# Patient Record
(demographics unavailable — no encounter records)

---

## 2025-05-19 NOTE — HEALTH RISK ASSESSMENT
[Fair] :  ~his/her~ mood as fair [2 - 4 times a month (2 pts)] : 2-4 times a month (2 points) [1 or 2 (0 pts)] : 1 or 2 (0 points) [Never (0 pts)] : Never (0 points) [No] : In the past 12 months have you used drugs other than those required for medical reasons? No [Any fall with injury in past year] : Patient reported fall with injury in the past year [2] : 1) Little interest or pleasure doing things for more than half of the days (2) [1] : 2) Feeling down, depressed, or hopeless for several days (1) [PHQ-2 Positive] : PHQ-2 Positive [1/2 of Days or More (2)] : 1.) Little interest or pleasure in doing things? Half the days or more [Several Days (1)] : 2.) Feeling down, depressed or hopeless? Several days [Nearly Every Day (3)] : 3.) Trouble falling asleep, or sleeping too much? Nearly every day [Not at All (0)] : 9.) Thoughts that you would be off dead or of hurting yourself in some way? Not at all [Mild] : Severity of Depression is Mild [Not at all] : How difficult have these problems made it for you to do your work, take care of things at home, or get along with people? Not at all [Alone] : lives alone [Feels Safe at Home] : Feels safe at home [Fully functional (bathing, dressing, toileting, transferring, walking, feeding)] : Fully functional (bathing, dressing, toileting, transferring, walking, feeding) [Fully functional (using the telephone, shopping, preparing meals, housekeeping, doing laundry, using] : Fully functional and needs no help or supervision to perform IADLs (using the telephone, shopping, preparing meals, housekeeping, doing laundry, using transportation, managing medications and managing finances) [Smoke Detector] : smoke detector [Seat Belt] :  uses seat belt [Former] : Former [20 or more] : 20 or more [< 15 Years] : < 15 Years [de-identified] : Pt on her feet all day for [employer] job but no formal exercise habits [de-identified] : Pt on modified diet for oral procedure; eating mushy foods cottage cheese, soups, tuna, yoghurt [Yes] : Yes [de-identified] : No specialists [Audit-CScore] : 2 [de-identified] : Fall with ED visit 5/4 [[id number]] : 3 [[id number]] : 6 [Change in mental status noted] : No change in mental status noted [Reports changes in hearing] : Reports no changes in hearing [Reports changes in vision] : Reports no changes in vision [de-identified] : quit 2018, was smoking 1ppd except for her pregnancies for 40 years

## 2025-05-19 NOTE — PAST MEDICAL HISTORY
[Postmenopausal] : postmenopausal [Total Preg ___] : G[unfilled] [Live Births ___] : P[unfilled]  [Full Term ___] : Full Term: [unfilled] [Abortions ___] : Abortions:[unfilled] [Living ___] : Living: [unfilled] [AB Spont ___] : miscarriages: [unfilled]  [de-identified] : last period 15 years ago

## 2025-05-19 NOTE — HISTORY OF PRESENT ILLNESS
[FreeTextEntry1] : Establish care CPE Diabetes ED follow up [de-identified] : Patient is a 67 year old female with PMHx DM2 who presents to our office to establish care.  Patient was seen in [facility] [place] [facility] 5/4 after a loss of conciousness leading to a fall which required stitches to her eyebrow. CT imaging including head, neck, and facial were negative. Patient followed up at  5/12 for suture removal where she was sent to the ED for elevated glucose levels and ketones in the urine. Glucose was 325 on 5/4 and 232 on 5/12. Patient had sutures removed, was previously on metformin for DM2 however did not have a PCP so had not been taking it. She was diagnosed with DMII in 2018. She was prescribed metforming by the ED while awaiting her appointment for today. Patient has thus been taking her metformin for the last two days. Pt does note episodes of lightheadedness, dizziness, and mood swings including irritability. Patient noted she was getting up to urinate a lot overnight however since starting metformin she states this has improved.  Patient has not followed with a doctor for many years. Pt was dx with DM2 in 2018, pt was started on metformin at that time and took for approx 1 year. Patient then was taking a berberbine supplement. She believes at time of diagnosis that her A1c was was approx 7.6%, and it was never rechecked per her knowledge. She was never on other medications for her diabetes.  Patient does take several OTC medications/supplements for sleep including magnesium glycinate, potassium, melatonin, and marijuana gummies which helps her to get 6 approx 6 hours of sleep per night. The only other supplement she takes is lions anam.  Patient has history of depression and suicide attempt where she took pills and alcohol in 2018 and was hospitalized for 10 days. She was trialed on antidepressants at that time however she did not like the way they made her feel and she stopped taking them. She now has a very strong support system. She no longer has thoughts of harming herself.

## 2025-05-19 NOTE — HISTORY OF PRESENT ILLNESS
[FreeTextEntry1] : Establish care CPE Diabetes ED follow up [de-identified] : Patient is a 67 year old female with PMHx DM2 who presents to our office to establish care.  Patient was seen in [facility] ED 5/4 after a loss of conciousness leading to a fall which required stitches to her eyebrow. CT imaging including head, neck, and facial were negative. Patient followed up at  5/12 for suture removal where she was sent to the ED for elevated glucose levels and ketones in the urine. Glucose was 325 on 5/4 and 232 on 5/12. Patient had sutures removed, was previously on metformin for DM2 however did not have a PCP so had not been taking it. She was diagnosed with DMII in 2018. She was prescribed metforming by the ED while awaiting her appointment for today. Patient has thus been taking her metformin for the last two days. Pt does note episodes of lightheadedness, dizziness, and mood swings including irritability. Patient noted she was getting up to urinate a lot overnight however since starting metformin she states this has improved.  Patient has not followed with a doctor for many years. Pt was dx with DM2 in 2018, pt was started on metformin at that time and took for approx 1 year. Patient then was taking a berberbine supplement. She believes at time of diagnosis that her A1c was was approx 7.6%, and it was never rechecked per her knowledge. She was never on other medications for her diabetes.  Patient does take several OTC medications/supplements for sleep including magnesium glycinate, potassium, melatonin, and marijuana gummies which helps her to get 6 approx 6 hours of sleep per night. The only other supplement she takes is lions anam.  Patient has history of depression and suicide attempt where she took pills and alcohol in 2018 and was hospitalized for 10 days. She was trialed on antidepressants at that time however she did not like the way they made her feel and she stopped taking them. She now has a very strong support system. She no longer has thoughts of harming herself.

## 2025-05-19 NOTE — PAST MEDICAL HISTORY
[Postmenopausal] : postmenopausal [Total Preg ___] : G[unfilled] [Live Births ___] : P[unfilled]  [Full Term ___] : Full Term: [unfilled] [Abortions ___] : Abortions:[unfilled] [Living ___] : Living: [unfilled] [AB Spont ___] : miscarriages: [unfilled]  [de-identified] : last period 15 years ago

## 2025-05-19 NOTE — HEALTH RISK ASSESSMENT
[Fair] :  ~his/her~ mood as fair [2 - 4 times a month (2 pts)] : 2-4 times a month (2 points) [1 or 2 (0 pts)] : 1 or 2 (0 points) [Never (0 pts)] : Never (0 points) [No] : In the past 12 months have you used drugs other than those required for medical reasons? No [Any fall with injury in past year] : Patient reported fall with injury in the past year [2] : 1) Little interest or pleasure doing things for more than half of the days (2) [1] : 2) Feeling down, depressed, or hopeless for several days (1) [PHQ-2 Positive] : PHQ-2 Positive [1/2 of Days or More (2)] : 1.) Little interest or pleasure in doing things? Half the days or more [Several Days (1)] : 2.) Feeling down, depressed or hopeless? Several days [Nearly Every Day (3)] : 3.) Trouble falling asleep, or sleeping too much? Nearly every day [Not at All (0)] : 9.) Thoughts that you would be off dead or of hurting yourself in some way? Not at all [Mild] : Severity of Depression is Mild [Not at all] : How difficult have these problems made it for you to do your work, take care of things at home, or get along with people? Not at all [Alone] : lives alone [Feels Safe at Home] : Feels safe at home [Fully functional (bathing, dressing, toileting, transferring, walking, feeding)] : Fully functional (bathing, dressing, toileting, transferring, walking, feeding) [Fully functional (using the telephone, shopping, preparing meals, housekeeping, doing laundry, using] : Fully functional and needs no help or supervision to perform IADLs (using the telephone, shopping, preparing meals, housekeeping, doing laundry, using transportation, managing medications and managing finances) [Smoke Detector] : smoke detector [Seat Belt] :  uses seat belt [Former] : Former [20 or more] : 20 or more [< 15 Years] : < 15 Years [de-identified] : 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

## 2025-05-19 NOTE — REVIEW OF SYSTEMS
[Dizziness] : dizziness [Fever] : no fever [Chills] : no chills [Night Sweats] : no night sweats [Sore Throat] : no sore throat [Chest Pain] : no chest pain [Palpitations] : no palpitations [Lower Ext Edema] : no lower extremity edema [Orthopnea] : no orthopnea [Shortness Of Breath] : no shortness of breath [Wheezing] : no wheezing [Cough] : no cough [Abdominal Pain] : no abdominal pain [Constipation] : no constipation [Diarrhea] : diarrhea [Vomiting] : no vomiting [Joint Pain] : no joint pain [Muscle Pain] : no muscle pain [Skin Rash] : no skin rash [Suicidal] : not suicidal [Frequency] : frequency [Negative] : Heme/Lymph [de-identified] : Lower extremity numbness/tingling

## 2025-05-19 NOTE — ASSESSMENT
[Vaccines Reviewed] : Immunizations reviewed today. Please see immunization details in the vaccine log within the immunization flowsheet.  [FreeTextEntry1] : Patient is a 67 year old female with PMHx DM2 who presents to our office to establish care.  Health Care Maintenance - Discussed healthy diet and physical activity - Obtain age appropriate screening, including: mammogram, colonoscopy, pap smear, DEXA scan - Discussed lung cancer screening. Patient is agreeable. - Tdap and PCV21 vaccines administered - Yearly flu vaccine encouraged  - Encouraged routine dental and vision care  DM2 - Recently started metformin in ED - Continue metformin 500 mg BID - Obtain A1c, lipid panel, TSH/T4 - Titrate medication dose as indicated by A1c  Peripheral neuropathy - Started gabapentin 300 mg qHS  Depression - PHQ 9 score of 6 - Mental health resources given

## 2025-05-19 NOTE — REVIEW OF SYSTEMS
[Dizziness] : dizziness [Fever] : no fever [Chills] : no chills [Night Sweats] : no night sweats [Sore Throat] : no sore throat [Chest Pain] : no chest pain [Palpitations] : no palpitations [Lower Ext Edema] : no lower extremity edema [Orthopnea] : no orthopnea [Shortness Of Breath] : no shortness of breath [Wheezing] : no wheezing [Cough] : no cough [Abdominal Pain] : no abdominal pain [Constipation] : no constipation [Diarrhea] : diarrhea [Vomiting] : no vomiting [Joint Pain] : no joint pain [Muscle Pain] : no muscle pain [Skin Rash] : no skin rash [Suicidal] : not suicidal [Frequency] : frequency [Negative] : Heme/Lymph [de-identified] : Lower extremity numbness/tingling

## 2025-05-19 NOTE — PHYSICAL EXAM
[No Acute Distress] : no acute distress [Well Nourished] : well nourished [Well Developed] : well developed [Well-Appearing] : well-appearing [Normal Sclera/Conjunctiva] : normal sclera/conjunctiva [PERRL] : pupils equal round and reactive to light [EOMI] : extraocular movements intact [Normal Outer Ear/Nose] : the outer ears and nose were normal in appearance [Normal Oropharynx] : the oropharynx was normal [Normal TMs] : both tympanic membranes were normal [No Lymphadenopathy] : no lymphadenopathy [Supple] : supple [Thyroid Normal, No Nodules] : the thyroid was normal and there were no nodules present [No Respiratory Distress] : no respiratory distress  [No Accessory Muscle Use] : no accessory muscle use [Clear to Auscultation] : lungs were clear to auscultation bilaterally [Normal Rate] : normal rate  [Regular Rhythm] : with a regular rhythm [Normal S1, S2] : normal S1 and S2 [No Murmur] : no murmur heard [Pedal Pulses Present] : the pedal pulses are present [No Edema] : there was no peripheral edema [No Extremity Clubbing/Cyanosis] : no extremity clubbing/cyanosis [Soft] : abdomen soft [Non Tender] : non-tender [Non-distended] : non-distended [Normal Bowel Sounds] : normal bowel sounds [Grossly Normal Strength/Tone] : grossly normal strength/tone [No Rash] : no rash [No Focal Deficits] : no focal deficits [Normal Affect] : the affect was normal [Normal Insight/Judgement] : insight and judgment were intact [Comprehensive Foot Exam Normal] : Right and left foot were examined and both feet are normal. No ulcers in either foot. Toes are normal and with full ROM.  Normal tactile sensation with monofilament testing throughout both feet [Normal Gait] : normal gait [Alert and Oriented x3] : oriented to person, place, and time [Normal Mood] : the mood was normal

## 2025-05-28 NOTE — HISTORY OF PRESENT ILLNESS
[FreeTextEntry1] : Follow up - T2DM [de-identified] : Patient is a 67-year-old female who presents for follow up. She has PMH of DMII, depression, and HLD. At the last appointment, A1c was obtained and found to be 11.9. She was started on insulin glargine 10u at night. She checks her fasting blood sugars in the morning, which have been between 110 - 130. She is keeping a log at home. She states that her mood and energy have improved since starting insulin. She continues to have numbness in her feet, worse at night. She was not able to obtain gabapentin from the pharmacy. She reports having done her diabetic eye exam, and was told it was normal.  Needs endocrinologist referral.  PHQ2 is 0. Denies any headache, dizziness, lightheadedness, chest pain or shortness of breath.

## 2025-05-28 NOTE — ASSESSMENT
[FreeTextEntry1] : Patient is a 67-year-old female with PMH of DMII, depression, and HLD who presents for follow up.   #DMII - Started on long-acting insulin QHS at last visit - Currently doing well with FS within acceptable range at Lantus 10u QHS. Continue to keep log. - Continue Lantus at current dose. Refill sent. - Endocrine referral placed - Continue healthy diet and physical activity - Follow up in 1 - 2 weeks with medications for review of insulin administration - Repeat A1c check in 3 months - Obtain KWASI results  #HLD - Continue atorvastatin - Repeat lipid panel in 3 months  #Insomnia - May use OTC magnesium glycinate as needed  #Neuropathy - Prescribed gabapentin 300 mg qHS  #Depression - Much improved  - Denies suicidal or homicidal ideations - PHQ2 - score of 0  - Declines therapy/medical intervention at this time - aware of resources available if needed

## 2025-05-28 NOTE — REVIEW OF SYSTEMS
[Negative] : Heme/Lymph [Insomnia] : insomnia [Depression] : no depression [de-identified] : Lower extremity neuropathy

## 2025-05-28 NOTE — HEALTH RISK ASSESSMENT
[0] : 2) Feeling down, depressed, or hopeless: Not at all (0) [PHQ-2 Negative - No further assessment needed] : PHQ-2 Negative - No further assessment needed [de-identified] : Eye doctor [[id number]] : 0

## 2025-05-28 NOTE — PHYSICAL EXAM
[No Acute Distress] : no acute distress [Well Nourished] : well nourished [Well Developed] : well developed [Well-Appearing] : well-appearing [Normal Sclera/Conjunctiva] : normal sclera/conjunctiva [PERRL] : pupils equal round and reactive to light [EOMI] : extraocular movements intact [Normal Outer Ear/Nose] : the outer ears and nose were normal in appearance [Supple] : supple [No Respiratory Distress] : no respiratory distress  [No Accessory Muscle Use] : no accessory muscle use [Clear to Auscultation] : lungs were clear to auscultation bilaterally [Normal Rate] : normal rate  [Regular Rhythm] : with a regular rhythm [Normal S1, S2] : normal S1 and S2 [No Edema] : there was no peripheral edema [Soft] : abdomen soft [Non Tender] : non-tender [Non-distended] : non-distended [Grossly Normal Strength/Tone] : grossly normal strength/tone [No Rash] : no rash [No Focal Deficits] : no focal deficits [Normal Gait] : normal gait [Normal Affect] : the affect was normal [Normal Insight/Judgement] : insight and judgment were intact [Alert and Oriented x3] : oriented to person, place, and time [Normal Mood] : the mood was normal

## 2025-06-05 NOTE — HISTORY OF PRESENT ILLNESS
[FreeTextEntry1] : Follow up for diabetes [de-identified] : Patient is a 67-year-old female PMH of T2DM, HLD, neuropathy, depression presents for follow up for diabetes.  Patient keeps a blood sugar log at home and reports AM POCT ranges of .  No readings of hypoglycemia or hyperglycemia. Patient reports working on diet including limiting carb intake and has been focusing on eating vegetables and lean meats. Currently on Lantus 10u qhs and metformin 500mg BID. She is also taking atorvastatin 40mg qd.  Has planned endocrinology appointment next month. Patient feels fine today and has no acute complaints. Overall is feeling much better since starting lantus. Denies fever, chills, CP, SOB, abdominal pain, polyuria, nausea, vomiting, diarrhea, or constipation.

## 2025-06-05 NOTE — PHYSICAL EXAM
[Normal Outer Ear/Nose] : the outer ears and nose were normal in appearance [Supple] : supple [Normal] : no respiratory distress, lungs were clear to auscultation bilaterally and no accessory muscle use [Normal Rate] : normal rate  [Regular Rhythm] : with a regular rhythm [Normal S1, S2] : normal S1 and S2 [No Edema] : there was no peripheral edema [Soft] : abdomen soft [Non Tender] : non-tender [Non-distended] : non-distended [No Joint Swelling] : no joint swelling [No Rash] : no rash [No Focal Deficits] : no focal deficits [Normal Gait] : normal gait [Normal Affect] : the affect was normal [Alert and Oriented x3] : oriented to person, place, and time [Normal Mood] : the mood was normal [Normal Insight/Judgement] : insight and judgment were intact [de-identified] : Ecchymosis/swelling around right eye reducing

## 2025-06-05 NOTE — PHYSICAL EXAM
[Normal Outer Ear/Nose] : the outer ears and nose were normal in appearance [Supple] : supple [Normal] : no respiratory distress, lungs were clear to auscultation bilaterally and no accessory muscle use [Normal Rate] : normal rate  [Regular Rhythm] : with a regular rhythm [Normal S1, S2] : normal S1 and S2 [No Edema] : there was no peripheral edema [Soft] : abdomen soft [Non Tender] : non-tender [Non-distended] : non-distended [No Joint Swelling] : no joint swelling [No Rash] : no rash [No Focal Deficits] : no focal deficits [Normal Gait] : normal gait [Normal Affect] : the affect was normal [Alert and Oriented x3] : oriented to person, place, and time [Normal Mood] : the mood was normal [Normal Insight/Judgement] : insight and judgment were intact [de-identified] : Ecchymosis/swelling around right eye reducing

## 2025-06-05 NOTE — PLAN
[FreeTextEntry1] : Patient is a 67-year-old female PMH of T2DM, HLD, neuropathy, depression presents for follow up for diabetes.   #DMII - Currently doing well with FS within acceptable range at Lantus 10u QHS. Continue to keep log. - Continue Lantus at current dose - Continue metformin 500mg bid, patient cannot tolerate higher dose due diarrhea - Continue healthy diet and physical activity - Repeat A1c check in August - patient has endocrinology appointment  #Elevated BP: - Patient with elevated BP with repeat during visit  - recommended to start ACE/ARB with patient due to history of diabetes, however patient is hesitant start BP medication at this time - Lifestyle modification advised: DASH diet, low sodium, regular exercise, weight loss, limit alcohol, smoking cessation - recommended patient to start BP log at home. BP cuff prescribed. - follow up in 4 - 6 weeks or sooner if needed  #HLD - Continue atorvastatin - Repeat lipid panel in August  #Neuropathy - continue gabapentin 300 mg qHS, resent prescription

## 2025-06-05 NOTE — HISTORY OF PRESENT ILLNESS
[FreeTextEntry1] : Follow up for diabetes [de-identified] : Patient is a 67-year-old female PMH of T2DM, HLD, neuropathy, depression presents for follow up for diabetes.  Patient keeps a blood sugar log at home and reports AM POCT ranges of .  No readings of hypoglycemia or hyperglycemia. Patient reports working on diet including limiting carb intake and has been focusing on eating vegetables and lean meats. Currently on Lantus 10u qhs and metformin 500mg BID. She is also taking atorvastatin 40mg qd.  Has planned endocrinology appointment next month. Patient feels fine today and has no acute complaints. Overall is feeling much better since starting lantus. Denies fever, chills, CP, SOB, abdominal pain, polyuria, nausea, vomiting, diarrhea, or constipation.

## 2025-07-02 NOTE — ASSESSMENT
[Diabetes Foot Care] : diabetes foot care [Long Term Vascular Complications] : long term vascular complications of diabetes [Carbohydrate Consistent Diet] : carbohydrate consistent diet [Importance of Diet and Exercise] : importance of diet and exercise to improve glycemic control, achieve weight loss and improve cardiovascular health [Exercise/Effect on Glucose] : exercise/effect on glucose [Self Monitoring of Blood Glucose] : self monitoring of blood glucose [Retinopathy Screening] : Patient was referred to ophthalmology for retinopathy screening [FreeTextEntry1] : 67 year old female with PMH of Type 2 Diabetes since 2018, ex smoker, HLD, neuropathy, depression is presenting to establish care for her Diabetes.    1. Type 2 diabetes mellitus. Point-of-care HbA1c 11.9% May --> 7.2% July 2025  -We discussed the cardiovascular and microvascular complications of uncontrolled diabetes. We discussed the importance of diet and exercise and lifestyle modification for glycemic control. We discussed referral to our diabetes educator and nutrition. We discussed pharmacologic options for glycemic control.  -I congratulated patient on reducing her  -Discontinue Lantus as patient is only on 10 units.  -Continue metformin, switch to ER and increase to 500mg 2 tabs BID after meals.  -Pt declined to see CDE at this time.  -Check SMBG 1-2x daily at varying times.  -Urine ACR Needs  -Opthal and Foot care discussed -, continue statin. Repeat in 3-6 months.   Patient verbalized understanding of the above. All questions were answered to patient's satisfaction. Dispo: Patient will follow up in 3 months.

## 2025-07-02 NOTE — HISTORY OF PRESENT ILLNESS
[FreeTextEntry1] :  is presenting to establish care for her Diabetes.  Works at [employer].   67 year old female with PMH of Type 2 Diabetes since 2018, BMI 22 (lost about ~20 pounds), ex smoker, HLD, neuropathy, depression. Patient was diet controlled diabetes since diagnosis, then fainted at restaurant. In ER found to have BG of 350 but not in DKA, was discharged home same day.   Reports no microvascular complications, no history of retinopathy, nephropathy or neuropathy.  Reports no history of cardiovascular risk factors, no history of CAD, CHF or CVA in the past.   Current DM meds: Lantus 10 units HS (started 2 months ago), Metformin 500mg BID (No Diarrhea). Prior DM meds:  Other pertinent meds:   POCT A1c%: 11.9% May --> 7.2% July 2025  POCT BG:   FSG: Checks 1x/daily Pre-Breakfast: 151, mostly 80 to 110s.  Pre-Lunch:  Pre-Dinner:  Bedtime:  Hypoglycemic episodes:   Diet: Has cut out carbs, sugars.  Breakfast: eggs, cottage cheese. Lemon water. Coffee with splash milk.  Lunch: Protein.  Dinner: avocado, salmon, tuna.  Snacks: No soda or juice. Fruit: Berries.   Exercise: Walking, swimming.  Urine micro: Needs ACE: C-peptide:  Cr: 0.68 GFR: 95  Lipid profile: TGs 139,  May 2025 Statin: Lipitor 40mg HS HbA1c%: 7.2%  Ophthalmology: No DR, every 6 months. Works at [employer].  Neuropathy: B/l LE  Podiatry/Diabetic foot exam: None

## 2025-07-02 NOTE — PHYSICAL EXAM
[Alert] : alert [Well Nourished] : well nourished [EOMI] : extra ocular movement intact [Normal Hearing] : hearing was normal [No Neck Mass] : no neck mass was observed [No Respiratory Distress] : no respiratory distress [No Accessory Muscle Use] : no accessory muscle use [Normal Rate] : heart rate was normal [Not Distended] : not distended [Normal Gait] : normal gait [Oriented x3] : oriented to person, place, and time [Normal Affect] : the affect was normal [Normal Mood] : the mood was normal

## 2025-07-29 NOTE — ASSESSMENT
[FreeTextEntry1] : Patient is a 67-year-old female PMH of T2DM, HLD, neuropathy, depression presents for diabetes and BP follow up.   #DMII - seen by endo 7/2/2025 was rec to stop lantus and increase metformin ER 500mg 2 tabs BID however pt restarted lantus on her own as BG were in 180s for a few days  - Currently doing well with FS within acceptable range at Lantus 10u QHS. Continue to keep log. - A1c improving 11.9-->7.2 (7/2025) - pt recovering from viral stomach bug [place] -recommended to stay hydrated and eat as tolerated to avoid hypoglycemia   - Continue Lantus at current dose for 1 month and will recheck A1c in august  - Continue metformin ER 500mg 2 tabs bid - pt tolerating w/o side effects  - Continue healthy diet and physical activity - pt declining to see diabetes educator at this time  - Repeat A1c check in August - follow w/ endo routinely   #Elevated BP -BP improved now normalized repeat /60 today  -c/w Lifestyle modification advised: DASH diet, low sodium, regular exercise, weight loss, limit alcohol, smoking cessation  #HLD - Continue atorvastatin - Repeat lipid panel in August

## 2025-07-29 NOTE — PHYSICAL EXAM
[TextEntry] : GEN: Healthy appearing, well-developed, NAD. NECK: Supple, with no masses. CV: RRR, no m/r/g. LUNGS: CTAB, no w/r/c. ABD: Soft, NT/ND, NBS, no masses or organomegaly. SKIN: Warm, well perfused. No skin rashes or abnormal lesions. EXT: No clubbing, cyanosis, or edema. NEURO: Ambulating with no limitations. Normal muscle strength and tone. No focal deficits. PSYCH: Good Judgment. AOx3. Normal memory, mood, and affect.

## 2025-07-29 NOTE — HISTORY OF PRESENT ILLNESS
[FreeTextEntry1] : follow up  [de-identified] : 67-year-old female PMH of T2DM, HLD, neuropathy, depression presents for BP and diabetes follow up. Pt was seen by kiersten this month and was taken off lantus and started on 500mg 2 tabs BID after meals. pt states the BG levels were increasing to 185s in the morning for the 3 days and so she started lantus again. does not have side effects from metformin increase. has been having nausea and diarrhea after vacation in [place] but now diarrhea has resolved. She states she hasnt been eating as much since she got back from vacation. Denies fever, chills, vomiting, weakness.

## 2025-07-29 NOTE — HISTORY OF PRESENT ILLNESS
[FreeTextEntry1] : follow up  [de-identified] : 67-year-old female PMH of T2DM, HLD, neuropathy, depression presents for BP and diabetes follow up. Pt was seen by kiersten this month and was taken off lantus and started on 500mg 2 tabs BID after meals. pt states the BG levels were increasing to 185s in the morning for the 3 days and so she started lantus again. does not have side effects from metformin increase. has been having nausea and diarrhea after vacation in [place] but now diarrhea has resolved. She states she hasnt been eating as much since she got back from vacation. Denies fever, chills, vomiting, weakness.

## 2025-07-29 NOTE — REVIEW OF SYSTEMS
[TextEntry] : General: No fever, no chills, no weight change.  Ocular: No drainage, no blurred vision.  HEENT: No sore throat, earache, or congestion. No neck pain.  Cardio: No chest pain. No palpitations.  Lungs: No shortness of breath or cough. No wheezing.  GI: + nausea, no vomiting, + mild diarrhea, no constipation, no anorexia.  : No dysuria, frequency, or urgency. No hematuria. Musculoskeletal: No joint pain or swelling or edema.  Psychiatric: No anxiety, no depression.  Endocrine: No polyuria or polydipsia.